# Patient Record
Sex: FEMALE | Race: WHITE | NOT HISPANIC OR LATINO | Employment: OTHER | URBAN - METROPOLITAN AREA
[De-identification: names, ages, dates, MRNs, and addresses within clinical notes are randomized per-mention and may not be internally consistent; named-entity substitution may affect disease eponyms.]

---

## 2022-05-25 ENCOUNTER — NEW PATIENT (OUTPATIENT)
Dept: URBAN - METROPOLITAN AREA CLINIC 38 | Facility: CLINIC | Age: 72
End: 2022-05-25

## 2022-05-25 DIAGNOSIS — H25.813: ICD-10-CM

## 2022-05-25 DIAGNOSIS — H52.7: ICD-10-CM

## 2022-05-25 DIAGNOSIS — H04.123: ICD-10-CM

## 2022-05-25 PROCEDURE — 99204 OFFICE O/P NEW MOD 45 MIN: CPT

## 2022-05-25 PROCEDURE — 92015 DETERMINE REFRACTIVE STATE: CPT

## 2022-05-25 ASSESSMENT — VISUAL ACUITY
OU_SC: J2
OU_SC: 20/25-1
OS_SC: 20/100-1
OD_SC: J12
OD_SC: 20/30-2
OS_SC: J2
OS_PH: 20/25-1

## 2022-05-25 ASSESSMENT — TONOMETRY
OS_IOP_MMHG: 14
OD_IOP_MMHG: 14

## 2022-08-02 ENCOUNTER — CONSULTATION/EVALUATION (OUTPATIENT)
Dept: URBAN - METROPOLITAN AREA CLINIC 39 | Facility: CLINIC | Age: 72
End: 2022-08-02

## 2022-08-02 DIAGNOSIS — H04.123: ICD-10-CM

## 2022-08-02 DIAGNOSIS — H25.813: ICD-10-CM

## 2022-08-02 DIAGNOSIS — H18.513: ICD-10-CM

## 2022-08-02 DIAGNOSIS — H35.372: ICD-10-CM

## 2022-08-02 PROCEDURE — 92134 CPTRZ OPH DX IMG PST SGM RTA: CPT

## 2022-08-02 PROCEDURE — 92025CV CORNEAL TOPOGRAPHY, CV

## 2022-08-02 PROCEDURE — 92136TC INTERFEROMETRY - TECHNICAL COMPONENT

## 2022-08-02 PROCEDURE — 92286 ANT SGM IMG I&R SPECLR MIC: CPT

## 2022-08-02 PROCEDURE — 99214 OFFICE O/P EST MOD 30 MIN: CPT

## 2022-08-02 PROCEDURE — V2799PMN IMPRIMIS PRED-MOXI-NEPAF 5ML

## 2022-08-02 ASSESSMENT — VISUAL ACUITY
OS_AM: 20/20
OD_RAM: 20/20
OS_SC: J3
OD_SC: <J12
OD_BAT: 20/60
OS_BAT: 20/100
OD_SC: 20/40+2
OS_SC: 20/200

## 2022-08-02 ASSESSMENT — TONOMETRY
OS_IOP_MMHG: 15
OD_IOP_MMHG: 16

## 2022-08-02 NOTE — PATIENT DISCUSSION
The patient understands that because of her prior Lasik, she may not be a candidate for epi-Lasek enhancement if needed. If this is the case, the patient understands she will need glasses to see her clearest; patient wants to proceed with Custom Monovision surgery.

## 2022-08-17 ENCOUNTER — SURGERY/PROCEDURE (OUTPATIENT)
Dept: URBAN - METROPOLITAN AREA CLINIC 39 | Facility: CLINIC | Age: 72
End: 2022-08-17

## 2022-08-17 ENCOUNTER — PRE-OP/H&P (OUTPATIENT)
Dept: URBAN - METROPOLITAN AREA CLINIC 39 | Facility: CLINIC | Age: 72
End: 2022-08-17

## 2022-08-17 DIAGNOSIS — H35.372: ICD-10-CM

## 2022-08-17 DIAGNOSIS — H04.123: ICD-10-CM

## 2022-08-17 DIAGNOSIS — H25.813: ICD-10-CM

## 2022-08-17 DIAGNOSIS — H18.513: ICD-10-CM

## 2022-08-17 DIAGNOSIS — H18.591: ICD-10-CM

## 2022-08-17 DIAGNOSIS — H52.13: ICD-10-CM

## 2022-08-17 PROCEDURE — 66999LNSR LENSAR LASER FOR CAT SX

## 2022-08-17 PROCEDURE — 99211T TECH SERVICE

## 2022-08-17 PROCEDURE — 66984CV REMOVE CATARACT, INSERT LENS, CUSTOM VISION

## 2022-08-17 PROCEDURE — 65772LRI LRI DURING CAT SX

## 2022-08-18 ENCOUNTER — POST-OP (OUTPATIENT)
Dept: URBAN - METROPOLITAN AREA CLINIC 38 | Facility: CLINIC | Age: 72
End: 2022-08-18

## 2022-08-18 DIAGNOSIS — Z96.1: ICD-10-CM

## 2022-08-18 PROCEDURE — 99024 POSTOP FOLLOW-UP VISIT: CPT

## 2022-08-18 ASSESSMENT — VISUAL ACUITY
OS_SC: 20/50
OS_SC: J1-
OD_SC: J2

## 2022-08-18 ASSESSMENT — TONOMETRY
OD_IOP_MMHG: 14
OS_IOP_MMHG: 12

## 2022-08-22 ENCOUNTER — POST OP/EVAL OF SECOND EYE (OUTPATIENT)
Dept: URBAN - METROPOLITAN AREA CLINIC 38 | Facility: CLINIC | Age: 72
End: 2022-08-22

## 2022-08-22 DIAGNOSIS — Z96.1: ICD-10-CM

## 2022-08-22 DIAGNOSIS — H25.812: ICD-10-CM

## 2022-08-22 PROCEDURE — 99024 POSTOP FOLLOW-UP VISIT: CPT

## 2022-08-22 PROCEDURE — 92012 INTRM OPH EXAM EST PATIENT: CPT

## 2022-08-22 ASSESSMENT — TONOMETRY
OS_IOP_MMHG: 15
OD_IOP_MMHG: 14

## 2022-08-22 ASSESSMENT — VISUAL ACUITY
OS_SC: J2
OS_SC: 20/60
OD_SC: 20/20-1
OD_SC: J6

## 2022-08-22 NOTE — PATIENT DISCUSSION
Cataract surgery has been performed in the first eye and activities of daily living are still impaired. The patient would like to proceed with cataract surgery in the second eye as scheduled. The patient elects Custom vision OS, goal of -1.75.

## 2022-08-23 ENCOUNTER — PRE-OP/H&P (OUTPATIENT)
Dept: URBAN - METROPOLITAN AREA SURGERY 14 | Facility: SURGERY | Age: 72
End: 2022-08-23

## 2022-08-23 ENCOUNTER — SURGERY/PROCEDURE (OUTPATIENT)
Dept: URBAN - METROPOLITAN AREA CLINIC 39 | Facility: CLINIC | Age: 72
End: 2022-08-23

## 2022-08-23 DIAGNOSIS — Z96.1: ICD-10-CM

## 2022-08-23 DIAGNOSIS — H25.812: ICD-10-CM

## 2022-08-23 PROCEDURE — 99211T TECH SERVICE

## 2022-08-23 PROCEDURE — 66984CV REMOVE CATARACT, INSERT LENS, CUSTOM VISION

## 2022-08-23 PROCEDURE — 65772LRI LRI DURING CAT SX

## 2022-08-23 PROCEDURE — 66999LNSR LENSAR LASER FOR CAT SX

## 2022-08-24 ENCOUNTER — POST-OP (OUTPATIENT)
Dept: URBAN - METROPOLITAN AREA CLINIC 38 | Facility: CLINIC | Age: 72
End: 2022-08-24

## 2022-08-24 DIAGNOSIS — Z96.1: ICD-10-CM

## 2022-08-24 PROCEDURE — 99024 POSTOP FOLLOW-UP VISIT: CPT

## 2022-08-24 ASSESSMENT — VISUAL ACUITY
OU_SC: J1
OD_SC: 20/20
OD_SC: J6
OU_SC: 20/20
OS_SC: J2
OS_SC: 20/200

## 2022-08-24 ASSESSMENT — TONOMETRY
OS_IOP_MMHG: 15
OD_IOP_MMHG: 14

## 2022-09-08 ENCOUNTER — POST-OP (OUTPATIENT)
Dept: URBAN - METROPOLITAN AREA CLINIC 38 | Facility: CLINIC | Age: 72
End: 2022-09-08

## 2022-09-08 DIAGNOSIS — Z96.1: ICD-10-CM

## 2022-09-08 PROCEDURE — 99024 POSTOP FOLLOW-UP VISIT: CPT

## 2022-09-08 ASSESSMENT — TONOMETRY
OS_IOP_MMHG: 14
OD_IOP_MMHG: 14

## 2022-09-08 ASSESSMENT — VISUAL ACUITY
OU_SC: J1-
OS_SC: 20/200
OD_SC: J4
OS_SC: J2-
OD_SC: 20/20
OU_SC: 20/20

## 2023-10-09 ENCOUNTER — COMPREHENSIVE EXAM (OUTPATIENT)
Dept: URBAN - METROPOLITAN AREA CLINIC 38 | Facility: CLINIC | Age: 73
End: 2023-10-09

## 2023-10-09 DIAGNOSIS — H18.591: ICD-10-CM

## 2023-10-09 DIAGNOSIS — H26.492: ICD-10-CM

## 2023-10-09 DIAGNOSIS — H52.203: ICD-10-CM

## 2023-10-09 DIAGNOSIS — Z96.1: ICD-10-CM

## 2023-10-09 DIAGNOSIS — H52.12: ICD-10-CM

## 2023-10-09 DIAGNOSIS — H52.01: ICD-10-CM

## 2023-10-09 DIAGNOSIS — H52.4: ICD-10-CM

## 2023-10-09 DIAGNOSIS — H18.513: ICD-10-CM

## 2023-10-09 DIAGNOSIS — H26.491: ICD-10-CM

## 2023-10-09 DIAGNOSIS — H04.123: ICD-10-CM

## 2023-10-09 DIAGNOSIS — H35.372: ICD-10-CM

## 2023-10-09 DIAGNOSIS — Z98.890: ICD-10-CM

## 2023-10-09 PROCEDURE — 92014 COMPRE OPH EXAM EST PT 1/>: CPT

## 2023-10-09 PROCEDURE — 92015 DETERMINE REFRACTIVE STATE: CPT

## 2023-10-09 ASSESSMENT — TONOMETRY
OD_IOP_MMHG: 14
OS_IOP_MMHG: 15

## 2023-10-09 ASSESSMENT — VISUAL ACUITY
OS_SC: 20/200
OU_SC: 20/20
OS_SC: J4+
OD_SC: J6
OD_SC: 20/20-1
OU_SC: J3

## 2023-11-07 ENCOUNTER — SURGERY/PROCEDURE (OUTPATIENT)
Dept: URBAN - METROPOLITAN AREA SURGERY 14 | Facility: SURGERY | Age: 73
End: 2023-11-07

## 2023-11-07 ENCOUNTER — CONSULTATION/EVALUATION (OUTPATIENT)
Dept: URBAN - METROPOLITAN AREA CLINIC 39 | Facility: CLINIC | Age: 73
End: 2023-11-07

## 2023-11-07 DIAGNOSIS — H26.491: ICD-10-CM

## 2023-11-07 DIAGNOSIS — H18.513: ICD-10-CM

## 2023-11-07 DIAGNOSIS — H18.591: ICD-10-CM

## 2023-11-07 DIAGNOSIS — H26.492: ICD-10-CM

## 2023-11-07 DIAGNOSIS — H04.123: ICD-10-CM

## 2023-11-07 DIAGNOSIS — H35.372: ICD-10-CM

## 2023-11-07 PROCEDURE — 66821 AFTER CATARACT LASER SURGERY: CPT

## 2023-11-07 PROCEDURE — 92014 COMPRE OPH EXAM EST PT 1/>: CPT

## 2023-11-07 PROCEDURE — 92134 CPTRZ OPH DX IMG PST SGM RTA: CPT

## 2023-11-07 ASSESSMENT — TONOMETRY
OD_IOP_MMHG: 10
OS_IOP_MMHG: 12

## 2023-11-07 ASSESSMENT — VISUAL ACUITY
OS_SC: J1
OS_SC: 20/200
OD_BAT: 20/40
OD_SC: 20/25-2
OD_SC: J12

## 2023-11-17 ENCOUNTER — POST-OP (OUTPATIENT)
Dept: URBAN - METROPOLITAN AREA CLINIC 38 | Facility: CLINIC | Age: 73
End: 2023-11-17

## 2023-11-17 DIAGNOSIS — Z98.890: ICD-10-CM

## 2023-11-17 PROCEDURE — 99024 POSTOP FOLLOW-UP VISIT: CPT

## 2023-11-17 ASSESSMENT — VISUAL ACUITY
OS_SC: J2
OD_SC: 20/20
OS_SC: 20/200
OD_SC: J6
OS_PH: 20/40-2

## 2023-11-17 ASSESSMENT — TONOMETRY
OS_IOP_MMHG: 12
OD_IOP_MMHG: 13

## 2024-11-18 ENCOUNTER — COMPREHENSIVE EXAM (OUTPATIENT)
Dept: URBAN - METROPOLITAN AREA CLINIC 38 | Facility: CLINIC | Age: 74
End: 2024-11-18

## 2024-11-18 DIAGNOSIS — H35.372: ICD-10-CM

## 2024-11-18 DIAGNOSIS — H04.123: ICD-10-CM

## 2024-11-18 DIAGNOSIS — H18.513: ICD-10-CM

## 2024-11-18 DIAGNOSIS — Z98.890: ICD-10-CM

## 2024-11-18 DIAGNOSIS — H52.7: ICD-10-CM

## 2024-11-18 DIAGNOSIS — Z96.1: ICD-10-CM

## 2024-11-18 DIAGNOSIS — H18.591: ICD-10-CM

## 2024-11-18 DIAGNOSIS — H26.492: ICD-10-CM

## 2024-11-18 PROCEDURE — 92134 CPTRZ OPH DX IMG PST SGM RTA: CPT

## 2024-11-18 PROCEDURE — 92014 COMPRE OPH EXAM EST PT 1/>: CPT

## 2024-11-18 PROCEDURE — 92015 DETERMINE REFRACTIVE STATE: CPT
